# Patient Record
Sex: FEMALE | Race: WHITE | NOT HISPANIC OR LATINO
[De-identification: names, ages, dates, MRNs, and addresses within clinical notes are randomized per-mention and may not be internally consistent; named-entity substitution may affect disease eponyms.]

---

## 2017-05-02 PROBLEM — Z00.00 ENCOUNTER FOR PREVENTIVE HEALTH EXAMINATION: Status: ACTIVE | Noted: 2017-05-02

## 2017-05-10 ENCOUNTER — APPOINTMENT (OUTPATIENT)
Dept: OTOLARYNGOLOGY | Facility: CLINIC | Age: 62
End: 2017-05-10

## 2017-05-10 VITALS — WEIGHT: 170 LBS | BODY MASS INDEX: 26.68 KG/M2 | HEIGHT: 67 IN

## 2017-05-10 VITALS
SYSTOLIC BLOOD PRESSURE: 167 MMHG | OXYGEN SATURATION: 97 % | HEART RATE: 53 BPM | TEMPERATURE: 97.9 F | DIASTOLIC BLOOD PRESSURE: 91 MMHG

## 2017-05-10 DIAGNOSIS — Z82.2 FAMILY HISTORY OF DEAFNESS AND HEARING LOSS: ICD-10-CM

## 2017-05-10 DIAGNOSIS — Z78.9 OTHER SPECIFIED HEALTH STATUS: ICD-10-CM

## 2017-05-10 DIAGNOSIS — R42 DIZZINESS AND GIDDINESS: ICD-10-CM

## 2017-05-10 DIAGNOSIS — F15.90 OTHER STIMULANT USE, UNSPECIFIED, UNCOMPLICATED: ICD-10-CM

## 2017-05-10 DIAGNOSIS — Z82.49 FAMILY HISTORY OF ISCHEMIC HEART DISEASE AND OTHER DISEASES OF THE CIRCULATORY SYSTEM: ICD-10-CM

## 2017-05-10 DIAGNOSIS — H93.11 TINNITUS, RIGHT EAR: ICD-10-CM

## 2017-05-10 DIAGNOSIS — H91.90 UNSPECIFIED HEARING LOSS, UNSPECIFIED EAR: ICD-10-CM

## 2017-05-10 RX ORDER — PREDNISONE 10 MG/1
10 TABLET ORAL
Qty: 57 | Refills: 0 | Status: ACTIVE | COMMUNITY
Start: 2017-05-10 | End: 1900-01-01

## 2017-05-10 RX ORDER — OMEPRAZOLE 20 MG/1
20 CAPSULE, DELAYED RELEASE ORAL DAILY
Qty: 14 | Refills: 0 | Status: ACTIVE | COMMUNITY
Start: 2017-05-10 | End: 1900-01-01

## 2017-05-10 RX ORDER — OFLOXACIN OTIC 3 MG/ML
0.3 SOLUTION AURICULAR (OTIC) TWICE DAILY
Qty: 1 | Refills: 1 | Status: ACTIVE | COMMUNITY
Start: 2017-05-10 | End: 1900-01-01

## 2017-05-15 ENCOUNTER — APPOINTMENT (OUTPATIENT)
Dept: OTOLARYNGOLOGY | Facility: CLINIC | Age: 62
End: 2017-05-15

## 2017-05-15 VITALS
DIASTOLIC BLOOD PRESSURE: 82 MMHG | TEMPERATURE: 98.2 F | OXYGEN SATURATION: 95 % | HEART RATE: 51 BPM | SYSTOLIC BLOOD PRESSURE: 149 MMHG

## 2017-05-15 DIAGNOSIS — H91.21 SUDDEN IDIOPATHIC HEARING LOSS, RIGHT EAR: ICD-10-CM

## 2017-05-16 PROBLEM — H91.21 SUDDEN RIGHT HEARING LOSS: Status: ACTIVE | Noted: 2017-05-10

## 2017-05-18 ENCOUNTER — APPOINTMENT (OUTPATIENT)
Dept: OTOLARYNGOLOGY | Facility: CLINIC | Age: 62
End: 2017-05-18

## 2017-05-22 ENCOUNTER — APPOINTMENT (OUTPATIENT)
Dept: OTOLARYNGOLOGY | Facility: CLINIC | Age: 62
End: 2017-05-22

## 2017-05-22 VITALS
OXYGEN SATURATION: 97 % | DIASTOLIC BLOOD PRESSURE: 89 MMHG | TEMPERATURE: 98.9 F | HEART RATE: 52 BPM | SYSTOLIC BLOOD PRESSURE: 154 MMHG

## 2017-05-22 RX ORDER — OMEPRAZOLE 20 MG/1
20 CAPSULE, DELAYED RELEASE ORAL DAILY
Qty: 7 | Refills: 0 | Status: ACTIVE | COMMUNITY
Start: 2017-05-22 | End: 1900-01-01

## 2017-05-31 ENCOUNTER — APPOINTMENT (OUTPATIENT)
Dept: OTOLARYNGOLOGY | Facility: CLINIC | Age: 62
End: 2017-05-31

## 2017-05-31 VITALS
SYSTOLIC BLOOD PRESSURE: 139 MMHG | TEMPERATURE: 97.7 F | HEART RATE: 51 BPM | DIASTOLIC BLOOD PRESSURE: 81 MMHG | OXYGEN SATURATION: 96 %

## 2017-05-31 DIAGNOSIS — H92.02 OTALGIA, LEFT EAR: ICD-10-CM

## 2017-07-26 ENCOUNTER — APPOINTMENT (OUTPATIENT)
Dept: OTOLARYNGOLOGY | Facility: CLINIC | Age: 62
End: 2017-07-26

## 2017-07-26 VITALS — SYSTOLIC BLOOD PRESSURE: 144 MMHG | DIASTOLIC BLOOD PRESSURE: 85 MMHG

## 2017-07-26 DIAGNOSIS — H61.23 IMPACTED CERUMEN, BILATERAL: ICD-10-CM

## 2017-07-26 DIAGNOSIS — D33.3 BENIGN NEOPLASM OF CRANIAL NERVES: ICD-10-CM

## 2018-02-01 ENCOUNTER — APPOINTMENT (OUTPATIENT)
Dept: OTOLARYNGOLOGY | Facility: CLINIC | Age: 63
End: 2018-02-01
Payer: COMMERCIAL

## 2018-02-01 PROCEDURE — 99213 OFFICE O/P EST LOW 20 MIN: CPT

## 2018-02-01 PROCEDURE — 92550 TYMPANOMETRY & REFLEX THRESH: CPT

## 2018-02-01 PROCEDURE — 92557 COMPREHENSIVE HEARING TEST: CPT

## 2018-02-02 ENCOUNTER — APPOINTMENT (OUTPATIENT)
Dept: PHARMACY | Facility: CLINIC | Age: 63
End: 2018-02-02
Payer: SELF-PAY

## 2018-02-02 PROCEDURE — V5010 ASSESSMENT FOR HEARING AID: CPT

## 2018-02-16 ENCOUNTER — APPOINTMENT (OUTPATIENT)
Dept: PHARMACY | Facility: CLINIC | Age: 63
End: 2018-02-16
Payer: SELF-PAY

## 2018-02-16 PROCEDURE — V5299A: CUSTOM | Mod: NC

## 2018-07-31 ENCOUNTER — EMERGENCY (EMERGENCY)
Facility: HOSPITAL | Age: 63
LOS: 1 days | Discharge: HOME | End: 2018-07-31
Attending: EMERGENCY MEDICINE | Admitting: EMERGENCY MEDICINE
Payer: COMMERCIAL

## 2018-07-31 VITALS
OXYGEN SATURATION: 100 % | SYSTOLIC BLOOD PRESSURE: 180 MMHG | DIASTOLIC BLOOD PRESSURE: 90 MMHG | HEART RATE: 60 BPM | TEMPERATURE: 98 F | RESPIRATION RATE: 17 BRPM

## 2018-07-31 PROCEDURE — 28510 TREATMENT OF TOE FRACTURE: CPT | Mod: 54

## 2018-07-31 PROCEDURE — 99284 EMERGENCY DEPT VISIT MOD MDM: CPT | Mod: 25

## 2018-07-31 PROCEDURE — 73630 X-RAY EXAM OF FOOT: CPT | Mod: 26,LT

## 2018-07-31 RX ORDER — IBUPROFEN 200 MG
600 TABLET ORAL ONCE
Qty: 0 | Refills: 0 | Status: COMPLETED | OUTPATIENT
Start: 2018-07-31 | End: 2018-07-31

## 2018-07-31 RX ADMIN — Medication 600 MILLIGRAM(S): at 19:34

## 2018-07-31 RX ADMIN — Medication 600 MILLIGRAM(S): at 19:04

## 2018-07-31 NOTE — ED ADULT NURSE NOTE - NSIMPLEMENTINTERV_GEN_ALL_ED
Implemented All Universal Safety Interventions:  [place] to call system. Call bell, personal items and telephone within reach. Instruct patient to call for assistance. Room bathroom lighting operational. Non-slip footwear when patient is off stretcher. Physically safe environment: no spills, clutter or unnecessary equipment. Stretcher in lowest position, wheels locked, appropriate side rails in place.

## 2018-07-31 NOTE — ED PROVIDER NOTE - OBJECTIVE STATEMENT
63 YOF no pmh p/w left foot 5th phalanx injury after stubbing toe this morning. Pt noticed bruising over the base of the 5th toe since the morning. Pt denies any numbness or tingling. Pt has pain with movement. Pt ambulatory.

## 2018-07-31 NOTE — ED PROVIDER NOTE - ATTENDING CONTRIBUTION TO CARE
Patient presenting with injury to the L 5th dits after stubbing it. VSS. + ttp 5th to  p and ecchymosis. Xray shows non-displaced fx MC. Brenden taped and given ortho fu.
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
## 2018-08-04 DIAGNOSIS — Z91.013 ALLERGY TO SEAFOOD: ICD-10-CM

## 2018-08-04 DIAGNOSIS — Z88.0 ALLERGY STATUS TO PENICILLIN: ICD-10-CM
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
## 2018-08-04 DIAGNOSIS — S92.515A NONDISPLACED FRACTURE OF PROXIMAL PHALANX OF LEFT LESSER TOE(S), INITIAL ENCOUNTER FOR CLOSED FRACTURE: ICD-10-CM
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
## 2018-08-04 DIAGNOSIS — S99.922A UNSPECIFIED INJURY OF LEFT FOOT, INITIAL ENCOUNTER: ICD-10-CM

## 2019-01-31 ENCOUNTER — APPOINTMENT (OUTPATIENT)
Dept: OTOLARYNGOLOGY | Facility: CLINIC | Age: 64
End: 2019-01-31
Payer: COMMERCIAL

## 2019-01-31 VITALS
SYSTOLIC BLOOD PRESSURE: 163 MMHG | HEART RATE: 54 BPM | OXYGEN SATURATION: 98 % | DIASTOLIC BLOOD PRESSURE: 82 MMHG | RESPIRATION RATE: 17 BRPM | TEMPERATURE: 98.2 F

## 2019-01-31 DIAGNOSIS — H90.41 SENSORINEURAL HEARING LOSS, UNILATERAL, RIGHT EAR, WITH UNRESTRICTED HEARING ON THE CONTRALATERAL SIDE: ICD-10-CM

## 2019-01-31 PROCEDURE — 92557 COMPREHENSIVE HEARING TEST: CPT

## 2019-01-31 PROCEDURE — 92550 TYMPANOMETRY & REFLEX THRESH: CPT

## 2019-01-31 PROCEDURE — 99213 OFFICE O/P EST LOW 20 MIN: CPT

## 2019-01-31 NOTE — HISTORY OF PRESENT ILLNESS
[de-identified] : followup r asymm hf snhl and r acousitc neuroma- s/p treatment with gamma knife by Dr Mackenzie- scheduled for followup mri with him in March. She reports her hearing seems the same. She has been using loaner hearing aids with some succcess. She feels her hl uis moderate. No family hisotry relates to reason for visit.

## 2019-04-08 ENCOUNTER — APPOINTMENT (OUTPATIENT)
Dept: PHARMACY | Facility: CLINIC | Age: 64
End: 2019-04-08
Payer: SELF-PAY

## 2019-04-08 PROCEDURE — V5299A: CUSTOM | Mod: NC

## 2019-06-18 ENCOUNTER — APPOINTMENT (OUTPATIENT)
Dept: PHARMACY | Facility: CLINIC | Age: 64
End: 2019-06-18
Payer: SELF-PAY

## 2019-06-18 PROCEDURE — V5257F: CUSTOM

## 2019-07-10 ENCOUNTER — APPOINTMENT (OUTPATIENT)
Dept: PHARMACY | Facility: CLINIC | Age: 64
End: 2019-07-10
Payer: SELF-PAY

## 2019-07-10 PROCEDURE — V5299A: CUSTOM | Mod: NC

## 2019-08-19 ENCOUNTER — APPOINTMENT (OUTPATIENT)
Dept: OTOLARYNGOLOGY | Facility: CLINIC | Age: 64
End: 2019-08-19
Payer: COMMERCIAL

## 2019-08-19 VITALS
DIASTOLIC BLOOD PRESSURE: 80 MMHG | SYSTOLIC BLOOD PRESSURE: 158 MMHG | TEMPERATURE: 98.2 F | HEART RATE: 54 BPM | OXYGEN SATURATION: 98 %

## 2019-08-19 DIAGNOSIS — D33.3 BENIGN NEOPLASM OF CRANIAL NERVES: ICD-10-CM

## 2019-08-19 PROCEDURE — 99214 OFFICE O/P EST MOD 30 MIN: CPT

## 2019-08-19 RX ORDER — RANITIDINE 150 MG/1
150 TABLET ORAL DAILY
Qty: 30 | Refills: 5 | Status: ACTIVE | COMMUNITY
Start: 2019-08-19 | End: 1900-01-01

## 2019-08-19 RX ORDER — RANITIDINE HYDROCHLORIDE 150 MG/1
150 CAPSULE ORAL
Qty: 30 | Refills: 6 | Status: ACTIVE | COMMUNITY
Start: 2019-08-19 | End: 1900-01-01

## 2019-08-19 NOTE — DATA REVIEWED
[de-identified] : mri reviewed images with pt spouse and Dr Braden who I called to have it sent to me. an more enhancing but slightly smaller

## 2019-08-19 NOTE — HISTORY OF PRESENT ILLNESS
[de-identified] : 63 yo woman here with spouse c/o salty taste in mouth and also to followup on r acoustic neuroma. She has had gamma knife radiotherapy and had followup with Dr Mackenzie and said he did not seem concerned. had followup mri in 4/2019. The salty taste has been present for about a month. She saw Dr Thornton who she said examined her and saw no mucosal abnormalities and recommended she see an ent. She said hydration to 6 glasses of water/day improves it but it does not resolve. No pain no dysphagia -f.s.c She feels her hearing has not changed.

## 2019-08-19 NOTE — REASON FOR VISIT
[Initial Evaluation] : an initial evaluation for [FreeTextEntry2] : r acoustic neuroma, salty taste in mouth

## 2019-09-30 ENCOUNTER — APPOINTMENT (OUTPATIENT)
Dept: OTOLARYNGOLOGY | Facility: CLINIC | Age: 64
End: 2019-09-30
Payer: COMMERCIAL

## 2019-09-30 VITALS
SYSTOLIC BLOOD PRESSURE: 143 MMHG | RESPIRATION RATE: 16 BRPM | HEART RATE: 54 BPM | OXYGEN SATURATION: 97 % | DIASTOLIC BLOOD PRESSURE: 83 MMHG | TEMPERATURE: 97.8 F

## 2019-09-30 PROCEDURE — 99213 OFFICE O/P EST LOW 20 MIN: CPT

## 2019-09-30 NOTE — HISTORY OF PRESENT ILLNESS
[de-identified] : followup 65 yo woman with h/o acoustic neuroma and dysgeusia - she was found to have reflux laryngitis and started on dietary and mechanical precautions and ranitidine 6 weeks ago and is now noticing fullness i chest, need to sit up with eating and reflux sx. Dysgeusia is improved. She wishes to stop ranitidine because of the recent negative press. Has no dysphagia.

## 2019-09-30 NOTE — REASON FOR VISIT
[Subsequent Evaluation] : a subsequent evaluation for [FreeTextEntry2] : followup dysgeusia and reflux laryngitisi

## 2020-01-07 ENCOUNTER — APPOINTMENT (OUTPATIENT)
Dept: OTOLARYNGOLOGY | Facility: CLINIC | Age: 65
End: 2020-01-07
Payer: COMMERCIAL

## 2020-01-07 DIAGNOSIS — K21.9 ACUTE LARYNGITIS: ICD-10-CM

## 2020-01-07 DIAGNOSIS — J04.0 ACUTE LARYNGITIS: ICD-10-CM

## 2020-01-07 DIAGNOSIS — R43.2 PARAGEUSIA: ICD-10-CM

## 2020-01-07 PROCEDURE — 99214 OFFICE O/P EST MOD 30 MIN: CPT | Mod: 25

## 2020-01-07 PROCEDURE — 31575 DIAGNOSTIC LARYNGOSCOPY: CPT

## 2020-01-08 ENCOUNTER — APPOINTMENT (OUTPATIENT)
Dept: OTOLARYNGOLOGY | Facility: CLINIC | Age: 65
End: 2020-01-08
Payer: COMMERCIAL

## 2020-01-08 ENCOUNTER — TRANSCRIPTION ENCOUNTER (OUTPATIENT)
Age: 65
End: 2020-01-08

## 2020-01-08 ENCOUNTER — APPOINTMENT (OUTPATIENT)
Dept: PHARMACY | Facility: CLINIC | Age: 65
End: 2020-01-08
Payer: COMMERCIAL

## 2020-01-08 PROCEDURE — 92557 COMPREHENSIVE HEARING TEST: CPT

## 2020-01-08 PROCEDURE — 92550 TYMPANOMETRY & REFLEX THRESH: CPT

## 2020-01-08 PROCEDURE — V5299A: CUSTOM | Mod: NC

## 2020-01-20 PROBLEM — R43.2 DYSGEUSIA: Status: ACTIVE | Noted: 2019-08-19

## 2020-01-20 NOTE — ASSESSMENT
[FreeTextEntry1] : 64F here for initial evaluation. She reports a variety of otolaryngologic complaints over the past few months.\par She c/o nasal congestion, postnasal drip, nasal drainage with a 'wet nose' which is worst when she is moving/walking, with occasional mucus. Boroleum helps the nasal congestion and sour candies help her throat.\par She has a known right sided vestibular schwannoma, underwent gamma knife in 2017. She has regular surveillance MRIs and followup visits to monitor for tumor growth. Ever since completing GK, she has had a salty taste in the back of her throat with subjective right facial swelling, and has been told these are consequences of the GK. Additionally, she is followed by GI physicians for reflux and has been on occasional H2B (in AM) with a healthy diet/lifestyle. There is no h/o sinusitis, no hayfever sx or allergies, no asthma.\par On exam, nasal endoscopy shows mild turbinate hypertrophy with right middle meatal edema and relative obstruction and mild nasopharyngeal erythema w overlying crusting. Fiberoptic laryngoscopy just shows minimal hypopharyngeal edema. The rest of the head and neck exam is unremarkable.\par Unclear etiology to constellation of upper airway complaints. It is certainly possible that the GK is playing a role in sx, in addition to uncontrolled laryngopharyngeal reflux. Further, there is rhinitis and nasopharyngitis on exam today and also possible right sided sinonasal disease (right middle meatal obstruction).\par As such, instructed to take the H2B daily and not occasionally. Add nasal steroid daily with boroleum. Will get CT sinus to assess paranasal sinuses. RTO 6 weeks.

## 2020-01-20 NOTE — HISTORY OF PRESENT ILLNESS
[de-identified] : 64F here for initial evaluation.\par \par She reports a variety of otolaryngologic complaints over the past few months.\par She c/o nasal congestion, postnasal drip, nasal drainage with a 'wet nose' which is worst when she is moving/walking, with occasional mucus. Boroleum helps the nasal congestion and sour candies help her throat.\par \par She has a known right sided vestibular schwannoma, underwent gamma knife in 2017. She has regular surveillance MRIs and followup visits to monitor for tumor growth.\par Ever since completing GK, she has had a salty taste in the back of her throat wioth subjective right facial swelling, and has been told these are consequences of the GK.\par Additionally, she is followed by GI physicians for reflux and has been on occasional H2B (in AM) with a healthy diet/lifestyle.\par There is no h/o sinusitis, no hayfever sx or allergies, no asthma.\par \par ROS otherwise unremarkable.

## 2020-01-20 NOTE — DATA REVIEWED
17-Jun-2018 14:32
[No studies available for review at this time] : No studies available for review at this time

## 2020-01-20 NOTE — PROCEDURE
[FreeTextEntry3] : Nasal Endoscopy:\par mild turbinate hypertrophy\par mild right middle meatal edema and relative obstruction\par mild nasopharyngeal erythema w overlying crusting\par no mucopus or polyps\par \par Fiberoptic Laryngoscopy:\par upper airway widely patent\par TV symmetric and mobile\par minimal postcricoid edema

## 2020-01-20 NOTE — CONSULT LETTER
[Dear  ___] : Dear  [unfilled], [Consult Closing:] : Thank you very much for allowing me to participate in the care of this patient.  If you have any questions, please do not hesitate to contact me. [Courtesy Letter:] : I had the pleasure of seeing your patient, [unfilled], in my office today. [Sincerely,] : Sincerely, [FreeTextEntry3] : Julio C Manzanares MD\par Department of Otolaryngology - Head and Neck Surgery\par [facility]

## 2020-01-30 ENCOUNTER — APPOINTMENT (OUTPATIENT)
Dept: OTOLARYNGOLOGY | Facility: CLINIC | Age: 65
End: 2020-01-30

## 2020-02-14 NOTE — ED ADULT NURSE NOTE - NS ED NOTE  TALK SOMEONE YN
[FreeTextEntry1] : CERUMEN IMPACTION\par HYDROGEN PEROXIDE BEFORE THE BATH\par \par TEETHING SYNDROME\par 
No

## 2020-02-25 ENCOUNTER — APPOINTMENT (OUTPATIENT)
Dept: OTOLARYNGOLOGY | Facility: CLINIC | Age: 65
End: 2020-02-25
Payer: COMMERCIAL

## 2020-02-25 VITALS
HEIGHT: 67 IN | BODY MASS INDEX: 27.94 KG/M2 | WEIGHT: 178 LBS | DIASTOLIC BLOOD PRESSURE: 83 MMHG | SYSTOLIC BLOOD PRESSURE: 145 MMHG | HEART RATE: 55 BPM

## 2020-02-25 DIAGNOSIS — J31.0 CHRONIC RHINITIS: ICD-10-CM

## 2020-02-25 PROCEDURE — 99213 OFFICE O/P EST LOW 20 MIN: CPT

## 2020-02-25 NOTE — HISTORY OF PRESENT ILLNESS
[de-identified] : 64F here in followup.\par \par Since last seen 6 weeks ago, she is actually feeling better. She has been using the nasal steroid spray mostly daily and feels it is helping her congestion and postnasal drip. She has not been using the famotidine, but does maintain healthy diet/lifestyle. She still has runny nose when outside walking, though, but this is improved as well.\par CT Sinus 2/21/20 (I reviewed) shows no evidence of paranasal sinus disease and unremarkable.\par \par From prior note-\par She reports a variety of otolaryngologic complaints over the past few months.\par She c/o nasal congestion, postnasal drip, nasal drainage with a 'wet nose' which is worst when she is moving/walking, with occasional mucus. Boroleum helps the nasal congestion and sour candies help her throat.\par \par She has a known right sided vestibular schwannoma, underwent gamma knife in 2017. She has regular surveillance MRIs and followup visits to monitor for tumor growth.\par Ever since completing GK, she has had a salty taste in the back of her throat wioth subjective right facial swelling, and has been told these are consequences of the GK.\par Additionally, she is followed by GI physicians for reflux and has been on occasional H2B (in AM) with a healthy diet/lifestyle.\par There is no h/o sinusitis, no hayfever sx or allergies, no asthma.\par \par ROS otherwise unremarkable.

## 2020-02-25 NOTE — ASSESSMENT
[FreeTextEntry1] : 64F here in followup. Since last seen 6 weeks ago, she is actually feeling better. She has been using the nasal steroid spray mostly daily and feels it is helping her congestion and postnasal drip. She has not been using the famotidine, but does maintain healthy diet/lifestyle. She still has runny nose when outside walking, though, but this is improved as well.\par \par Initially seen 6 weeks ago with a variety of otolaryngologic complaints over the past few months.\par She c/o nasal congestion, postnasal drip, nasal drainage with a 'wet nose' which is worst when she is moving/walking, with occasional mucus. Boroleum helps the nasal congestion and sour candies help her throat.\par She has a known right sided vestibular schwannoma, underwent gamma knife in 2017. She has regular surveillance MRIs and followup visits to monitor for tumor growth. Ever since completing GK, she has had a salty taste in the back of her throat with subjective right facial swelling, and has been told these are consequences of the GK. Additionally, she is followed by GI physicians for reflux and has been on occasional H2B (in AM) with a healthy diet/lifestyle. There is no h/o sinusitis, no hayfever sx or allergies, no asthma.\par \par Recent CT Sinus 2/21/20 (I reviewed) shows no evidence of paranasal sinus disease and is otherwise unremarkable.\par \par On exam, rhinoscopy shows mild turbinate hypertrophy and mild mucus. The rest of the head and neck exam is unremarkable.\par Unclear etiology to constellation of upper airway complaints. However, given fact she is much better on the nasal steroid sprays, primary etiology is likely a rhinitis with postnasal drip and much less likely component for laryngopharyngeal reflux, since she has been off H2Bs.\par There is no e/o paranasal sinus disease on CT.\par She may also have a component of nonallergic vasomotor rhinitis given drainage while walking outside is colder temperatures as well.\par \par As such, for now, continue nasal steroid sprays; I will add ipratropium nasal spray as a trial to see if rhinorrhea improved as well. All questions answered' she will let me know how she is doing over the next 4-6 weeks.

## 2020-02-25 NOTE — CONSULT LETTER
[Dear  ___] : Dear  [unfilled], [Consult Closing:] : Thank you very much for allowing me to participate in the care of this patient.  If you have any questions, please do not hesitate to contact me. [Courtesy Letter:] : I had the pleasure of seeing your patient, [unfilled], in my office today. [Sincerely,] : Sincerely, [FreeTextEntry3] : Julio C Manzanares MD\par Department of Otolaryngology - Head and Neck Surgery\par [facility]

## 2020-02-25 NOTE — PROCEDURE
[FreeTextEntry3] : Rhinoscopy:\par mild turbinate hypertrophy, mild sinonasal mucosal erythema and mucus\par \par Nasal Endoscopy (from prior visit):\par mild turbinate hypertrophy\par mild right middle meatal edema and relative obstruction\par mild nasopharyngeal erythema w overlying crusting\par no mucopus or polyps\par \par Fiberoptic Laryngoscopy (from prior visit):\par upper airway widely patent\par TV symmetric and mobile\par minimal postcricoid edema

## 2020-02-25 NOTE — DATA REVIEWED
[de-identified] : CT Sinus 2/21/20:\par FINDINGS: \par \par Again there is severe deviation of the nasal septum to the left with a large right kendell bullosa. There is turbinate hypertrophy. The findings result in narrowing of the nasal passages.\par \par The ostiomeatal unit complexes are congenitally narrowed but patent. This can predispose to sinus disease.\par \par There is some mucosal thickening involving the septae of the ethmoid air cells. The frontoethmoidal recesses and the frontal sinus are clear.\par \par The sphenoid sinus and the sphenoethmoidal recesses are clear.\par \par The posterior nasopharynx is normal. The parapharyngeal fat is nondisplaced. The  space is normal.\par \par The lamina papyracea is intact. The cribriform plate is normal. The dileep anabelle is normal.\par \par There are no osteoblastic or osteolytic lesions identified. Right vestibular schwannoma difficult to see on CAT scan.\par \par IMPRESSION:  \par \par 1. Severe deviation of the nasal septum to the left with a large right kendell bullosa and turbinate hypertrophy narrowing the nasal passages.\par \par 2. 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 Mild mucosal thickening septae of the ethmoid air cells.\par \par 4. The remainder the paranasal sinuses are clear. The recesses are clear.

## 2020-05-01 ENCOUNTER — RX RENEWAL (OUTPATIENT)
Age: 65
End: 2020-05-01

## 2020-06-17 ENCOUNTER — APPOINTMENT (OUTPATIENT)
Dept: PHARMACY | Facility: CLINIC | Age: 65
End: 2020-06-17

## 2020-10-13 ENCOUNTER — RX RENEWAL (OUTPATIENT)
Age: 65
End: 2020-10-13

## 2022-04-11 PROBLEM — J04.0 REFLUX LARYNGITIS: Status: ACTIVE | Noted: 2019-09-30

## 2022-05-16 RX ORDER — IPRATROPIUM BROMIDE 21 UG/1
0.03 SPRAY NASAL
Qty: 1 | Refills: 2 | Status: ACTIVE | COMMUNITY
Start: 2020-02-25 | End: 1900-01-01

## 2022-05-16 RX ORDER — FLUTICASONE PROPIONATE 50 UG/1
50 SPRAY, METERED NASAL
Qty: 16 | Refills: 2 | Status: ACTIVE | COMMUNITY
Start: 2020-01-08 | End: 1900-01-01

## 2022-12-13 ENCOUNTER — EMERGENCY (EMERGENCY)
Facility: HOSPITAL | Age: 67
LOS: 1 days | Discharge: HOME | End: 2022-12-13
Attending: EMERGENCY MEDICINE | Admitting: EMERGENCY MEDICINE
Payer: COMMERCIAL

## 2022-12-13 ENCOUNTER — EMERGENCY (EMERGENCY)
Facility: HOSPITAL | Age: 67
LOS: 1 days | Discharge: HOME | End: 2022-12-13
Attending: EMERGENCY MEDICINE | Admitting: EMERGENCY MEDICINE

## 2022-12-13 VITALS
HEART RATE: 40 BPM | OXYGEN SATURATION: 100 % | RESPIRATION RATE: 18 BRPM | WEIGHT: 169.76 LBS | HEIGHT: 67 IN | DIASTOLIC BLOOD PRESSURE: 77 MMHG | SYSTOLIC BLOOD PRESSURE: 171 MMHG

## 2022-12-13 VITALS
TEMPERATURE: 98 F | RESPIRATION RATE: 16 BRPM | OXYGEN SATURATION: 100 % | SYSTOLIC BLOOD PRESSURE: 181 MMHG | DIASTOLIC BLOOD PRESSURE: 81 MMHG | HEART RATE: 63 BPM

## 2022-12-13 VITALS
RESPIRATION RATE: 16 BRPM | HEART RATE: 58 BPM | SYSTOLIC BLOOD PRESSURE: 166 MMHG | HEIGHT: 67 IN | DIASTOLIC BLOOD PRESSURE: 85 MMHG | TEMPERATURE: 98 F | OXYGEN SATURATION: 98 % | WEIGHT: 169.98 LBS

## 2022-12-13 DIAGNOSIS — S52.502A UNSPECIFIED FRACTURE OF THE LOWER END OF LEFT RADIUS, INITIAL ENCOUNTER FOR CLOSED FRACTURE: ICD-10-CM
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
## 2022-12-13 DIAGNOSIS — Z91.013 ALLERGY TO SEAFOOD: ICD-10-CM

## 2022-12-13 DIAGNOSIS — I10 ESSENTIAL (PRIMARY) HYPERTENSION: ICD-10-CM

## 2022-12-13 DIAGNOSIS — R42 DIZZINESS AND GIDDINESS: ICD-10-CM

## 2022-12-13 DIAGNOSIS — Z88.0 ALLERGY STATUS TO PENICILLIN: ICD-10-CM

## 2022-12-13 DIAGNOSIS — R00.1 BRADYCARDIA, UNSPECIFIED: ICD-10-CM
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
## 2022-12-13 PROCEDURE — 99284 EMERGENCY DEPT VISIT MOD MDM: CPT

## 2022-12-13 PROCEDURE — 73110 X-RAY EXAM OF WRIST: CPT | Mod: 26,LT,76

## 2022-12-13 PROCEDURE — 99285 EMERGENCY DEPT VISIT HI MDM: CPT | Mod: 57

## 2022-12-13 PROCEDURE — 25605 CLTX DST RDL FX/EPHYS SEP W/: CPT | Mod: 54

## 2022-12-13 RX ORDER — KETOROLAC TROMETHAMINE 30 MG/ML
15 SYRINGE (ML) INJECTION ONCE
Refills: 0 | Status: DISCONTINUED | OUTPATIENT
Start: 2022-12-13 | End: 2022-12-13

## 2022-12-13 RX ORDER — MORPHINE SULFATE 50 MG/1
4 CAPSULE, EXTENDED RELEASE ORAL ONCE
Refills: 0 | Status: DISCONTINUED | OUTPATIENT
Start: 2022-12-13 | End: 2022-12-13

## 2022-12-13 RX ORDER — LIDOCAINE HCL 20 MG/ML
10 VIAL (ML) INJECTION ONCE
Refills: 0 | Status: COMPLETED | OUTPATIENT
Start: 2022-12-13 | End: 2022-12-13

## 2022-12-13 RX ADMIN — Medication 10 MILLILITER(S): at 22:47

## 2022-12-13 RX ADMIN — Medication 15 MILLIGRAM(S): at 12:02

## 2022-12-13 RX ADMIN — MORPHINE SULFATE 4 MILLIGRAM(S): 50 CAPSULE, EXTENDED RELEASE ORAL at 13:28

## 2022-12-13 NOTE — ED ADULT NURSE REASSESSMENT NOTE - NS ED NURSE REASSESS COMMENT FT1
Pt received from previous shift. Pt resting on stretcher, no apparent distress, pending post splinting wrist imaging, denies pain. HR 58 on CM.

## 2022-12-13 NOTE — ED PROVIDER NOTE - OBJECTIVE STATEMENT
Patient reports she walked backwards to look at something and backed into a grate around a tree. fell, landed on left wrist. Afterwards, felt lightheaded. Denies head injury, cp, sob, ap, n/v/d. Found to be bradycardic. Patient reports when she had a colonoscopy a few weeks ago, she was told her HR was "a little low."

## 2022-12-13 NOTE — ED ADULT NURSE NOTE - NSIMPLEMENTINTERV_GEN_ALL_ED
Implemented All Fall Risk Interventions:  [place] to call system. Call bell, personal items and telephone within reach. Instruct patient to call for assistance. Room bathroom lighting operational. Non-slip footwear when patient is off stretcher. Physically safe environment: no spills, clutter or unnecessary equipment. Stretcher in lowest position, wheels locked, appropriate side rails in place. Provide visual cue, wrist band, yellow gown, etc. Monitor gait and stability. Monitor for mental status changes and reorient to person, place, and time. Review medications for side effects contributing to fall risk. Reinforce activity limits and safety measures with patient and family.

## 2022-12-13 NOTE — ED PROVIDER NOTE - CLINICAL SUMMARY MEDICAL DECISION MAKING FREE TEXT BOX
Patient with wrist fracture with incidental finding of bradycardia. HR normalized in ED without intervention. Will check labs, reassess.

## 2022-12-13 NOTE — ED PROVIDER NOTE - CARE PROVIDER_API CALL
Maria Victoria Anderson)  Orthopaedic Surgery Surgery  [address] [place] [address]  Phone: [phone]  Fax: [phone]  Follow Up Time: 7-10 Days

## 2022-12-13 NOTE — ED PROVIDER NOTE - PROGRESS NOTE DETAILS
scotty / josh -  pt w distal radius fx today, reduced by lhgv. here w L hand discoloration in splint.   plan for ortho to reduce / resplint.   at time of sign out pending ortho reduction, post reduction xr and final dispo shoemaker - reduced by ortho. post reduction imaging ok'd by ortho. ok for dc and outpt ortho f/u.     All results reviewed with the patient verbally. Discharge plan and return precautions d/w pt who verbalized understanding and agrees with plan. All questions answered. Vitals WNL. Ready for d/c.

## 2022-12-13 NOTE — ED PROVIDER NOTE - CARE PROVIDER_API CALL
Maria Victoria Anderson)  Orthopaedic Surgery Surgery  [address] [place] [address]  Phone: [phone]  Fax: [phone]  Follow Up Time:

## 2022-12-13 NOTE — ED ADULT NURSE NOTE - OBJECTIVE STATEMENT
pt received into proc room A&Ox4 ambulatory appears comfortable arrives via walk in triage for eval of splint placed to left wrist/ for arm at [facility] earlier today. Pt reports s/p mechanical FOOSH landed on left arm with dx left distal radial fx a splint was applied and pt DC'ed. shortly after getting home pt reports blue/purple discoloration to fingers of left hand and appearing more swollen. still able to wiggle fingers and sensation intact but appears swollen and discolored. Otherwise no complaints neuro intact unlabored respirations airway patent no cp sob cough. medicated per orders to be re splinted by ortho

## 2022-12-13 NOTE — ED ADULT NURSE NOTE - OBJECTIVE STATEMENT
pt has simple trip and fall in street today , foosh , did not hit head has almost compound fracture to left wrist , nv intact, pt got cab to er , found to be bradycardic @ 35 bpm , took her valsartan and amlodipine this am, nil c/o dizziness, gcs 15 throughout, iv line placed, code cart placed outside room

## 2022-12-13 NOTE — ED PROVIDER NOTE - ATTENDING APP SHARED VISIT CONTRIBUTION OF CARE
67f sustained left distal radius fracture earlier today, reduced and splinted at [facility], shortly thereafter developed coldness, valerie and discoloration of affected hand. on PA exam hand cool with purple discoloration, splint loosened by pa. on my exam hand regular color, warm, good cap refill. ortho paged for evaluation, resplinting.

## 2022-12-13 NOTE — ED PROCEDURE NOTE - NS ED PERI NEURO NEG
Pre-application: Motor, sensory, and vascular responses intact in the injured extremity./Post-application: Motor, sensory, and vascular responses intact in the injured extremity.
No

## 2022-12-13 NOTE — ED PROVIDER NOTE - PHYSICAL EXAMINATION
Gen: Well-developed, well-nourished, NAD, VS as noted by nursing. HEENT: NCAT, mmm   Chest: RRR, nl S1 and S2, no m/r/g. Resp: CTAB, no w/r/r  Abd: nl BS, soft, nt/nd. Ext: Warm, dry. left wrist deformity and limited ROM. neurovascular and tendon intact distal to injury   Neuro: CN II-XII intact, normal and equal strength, sensation, and reflexes bilaterally, normal gait  Psych: AAOx3

## 2022-12-13 NOTE — ED PROVIDER NOTE - PATIENT PORTAL LINK FT
You can access the FollowMyHealth Patient Portal offered by [facility] by registering at the following website: [email or web address]. By joining [employer]’s FollowMyHealth portal, you will also be able to view your health information using other applications (apps) compatible with our system.

## 2022-12-13 NOTE — ED PROVIDER NOTE - NS ED ATTENDING STATEMENT MOD
This was a shared visit with the ASMITA. I reviewed and verified the documentation and independently performed the documented:

## 2022-12-13 NOTE — ED PROVIDER NOTE - OBJECTIVE STATEMENT
66 yo f RHD with pmh of htn and hypothyroidism s/p trip and fall earlier today with L distal radius fx, splinted at [facility] c/o L hand changing color and cold that started shortly after she left the ED. Denies numbness, tingling.

## 2022-12-13 NOTE — ED PROVIDER NOTE - PHYSICAL EXAMINATION
CONSTITUTIONAL: Well-appearing;  in no apparent distress.   HEAD: Normocephalic; atraumatic.   NECK: Supple; non-tender;   CARDIOVASCULAR: rrr,  RESPIRATORY: Breathing easily;   MSK: LUE splint in place, fingers swollen, cool, and slightly discolored, good cap refill   EXT: No cyanosis or edema; N/V intact  SKIN: Normal for age and race; warm; dry; good turgor; no apparent lesions or rash.

## 2022-12-13 NOTE — ED PROVIDER NOTE - PROGRESS NOTE DETAILS
HR normalized. PRINCE Horton (PGY-3) - fracture reduced, see procedure note, splinted. d/w care w/ patient. Will follow up with ortho in 7 days.

## 2022-12-13 NOTE — ED PROVIDER NOTE - NSFOLLOWUPINSTRUCTIONS_ED_ALL_ED_FT
You were seen in the Emergency Department for a distal radius fracture. Your fracture was reduced and splinted. Please follow up with orthopaedics in 1 week. You can take acetaminophen and/or ibuprofen for pain.      1) Continue all previously prescribed medications as directed.    2) Follow up with your primary care physician - take copies of your results.    3) Return to the Emergency Department for worsening or persistent symptoms, and/or ANY NEW OR CONCERNING SYMPTOMS.      Colles Fracture       A Colles fracture is a type of broken wrist. It means that the radius bone is broken or cracked near the wrist joint. The radius is one of two bones in the forearm. It is on the same side as the thumb. The other forearm bone is called the ulna. Often, when someone has a Colles fracture, the ulna is also broken.    As this injury heals, a splint or a cast is used to prevent the injured bone from moving (keep it immobilized).      What are the causes?    Common causes of this type of fracture include:  •A hard, direct hit to the wrist.      •Injuries, such as a car crash or falling on an outstretched hand.        What increases the risk?    The following factors may make you more likely to develop this condition:  •Playing contact sports or high-risk sports, such as skiing, biking, or ice-skating.      •Smoking.      •Drinking more than three alcoholic beverages a day.      •Having low or lowered bone density (osteoporosis or osteopenia).      •Being an older adult. Young children also have a higher risk.      •Being a woman who has gone through menopause.      •Having a history of bone fractures.      •Not getting enough (having a deficiency in) calcium or vitamin D.        What are the signs or symptoms?    Symptoms of this condition include:  •Tenderness, bruising, and swelling over the fracture, which is usually near the wrist.      •The wrist hanging in an odd position or looking misshapen (deformed).      •Difficulty moving the wrist.        How is this diagnosed?    This condition may be diagnosed based on:  •A physical exam.      •Your symptoms and medical history.      •An X-ray of the forearm and wrist.        How is this treated?    Treatment depends on many factors, including your age, your activity level, and how severe your fracture is. Treatment may include:  •Immobilizing the wrist with a splint or a cast for several weeks. Before a splint or cast is placed on the arm, the health care provider may move the fractured bone or bones back into place (realignment).      •Surgery. This may be done if the bone is completely out of place (displaced). Metal pins or other devices may be used to help hold the bone in place while it heals. After surgery, the arm is put in a splint or cast.      •Physical therapy.      •Pain medicine.        Follow these instructions at home:    If you have a splint:     •Wear the splint as told by your health care provider. Remove it only as told by your health care provider.      •Loosen the splint if your fingers tingle, become numb, or turn cold and blue.      •Keep the splint clean.    •If your splint is not waterproof:  •Do not let it get wet.      •Cover it with a watertight covering when you take a bath or a shower.        If you have a cast:     • Do not stick anything inside the cast to scratch your skin. Doing that increases your risk for infection.      •Check the skin around the cast every day. Tell your health care provider about any concerns.      •You may put lotion on dry skin around the edges of the cast. Do not put lotion on the skin underneath the cast.      •Keep the cast clean.    •If the cast is not waterproof:   •Do not let it get wet.       •Cover it with a watertight covering when you take a bath or a shower.          Managing pain, stiffness, and swelling    •If directed, put ice on the injured area. To do this:  •If you have a removable splint, remove it as told by your health care provider.      •Put ice in a plastic bag.      •Place a towel between your skin and the bag, or between your cast and the bag.      •Leave the ice on for 20 minutes, 2–3 times a day.      •Remove the ice if your skin turns bright red. This is very important. If you cannot feel pain, heat, or cold, you have a greater risk of damage to the area.        •Move your fingers often to reduce stiffness and swelling.      •Raise (elevate) your wrist above the level of your heart while you are sitting or lying down.      Activity     • Do not lift anything that is heavier than 10 lb (4.5 kg), or the limit that you are told, until your health care provider says that it is safe.      • Do not use your arm to support your body weight until your health care provider says that you can.      •Ask your health care provider when it is safe to drive if you have a splint or cast on your arm.      •Return to your normal activities as told by your health care provider. Ask your health care provider what activities are safe for you.      •If physical therapy was prescribed, do exercises as told by your health care provider.      Medicines     •Take over-the-counter and prescription medicines only as told by your health care provider.    •Ask your health care provider if the medicine prescribed to you:  •Requires you to avoid driving or using machinery.    •Can cause constipation. You may need to take these actions to prevent or treat constipation:  •Drink enough fluid to keep your urine pale yellow.      •Take over-the-counter or prescription medicines.      •Eat foods that are high in fiber, such as beans, whole grains, and fresh fruits and vegetables.      •Limit foods that are high in fat and processed sugars, such as fried or sweet foods.          General instructions     • Do not put pressure on any part of the splint or cast until it is fully hardened. This may take several hours.      • Do not use any products that contain nicotine or tobacco, such as cigarettes, e-cigarettes, and chewing tobacco. These can delay bone healing. If you need help quitting, ask your health care provider.      •Keep all follow-up visits. This is important.        Contact a health care provider if:  •Your splint or cast:  •Gets wet or damaged.      •Suddenly feels too tight.        •You have a fever or chills.      •You have pain that does not get better with medicine.      •You have swelling that gets worse.        Get help right away if:    •Your hand or fingers become numb or turn cold, blue, or gray.      •You have tingling or numbness in your fingers, even after you loosen your splint (if this applies).        Summary    •A Colles fracture is a type of broken wrist. It often involves both of the bones in the forearm (radius and ulna).      •Fractures are common in people who play contact sports or high-risk sports. They are also common in older people who are at risk for osteoporosis.      •This injury is diagnosed with a physical exam and X-rays.      •Your wrist will need to be held in place (immobilized) with a splint or cast for several weeks. You may need surgery for a more severe fracture.      This information is not intended to replace advice given to you by your health care provider. Make sure you discuss any questions you have with your health care provider.

## 2022-12-13 NOTE — ED PROVIDER NOTE - CLINICAL SUMMARY MEDICAL DECISION MAKING FREE TEXT BOX
68 yo f RHD with pmh of htn and hypothyroidism s/p trip and fall earlier today with L distal radius fx, splinted at [facility] c/o L hand changing color and cold that started shortly after she left the ED. Denies numbness, tingling. LUE splint in place, fingers swollen, cool, and slightly discolored, good cap refill. Ace wrap removed and splint loosened. Color and temp already improving. Ortho consulted

## 2022-12-13 NOTE — ED PROVIDER NOTE - PATIENT PORTAL LINK FT
You can access the FollowMyHealth Patient Portal offered by [facility] by registering at the following website: [email or web address]. By joining [employer]’s FollowMyHealth portal, you will also be able to view your health information using other applications (apps) compatible with our system.

## 2022-12-13 NOTE — ED ADULT TRIAGE NOTE - CHIEF COMPLAINT QUOTE
s/p splint placement on L wrist, now c/o hand swelling and turning blue. was told by ortho to come to the ED. + ROM, + sensation

## 2022-12-14 PROBLEM — I10 ESSENTIAL (PRIMARY) HYPERTENSION: Chronic | Status: ACTIVE | Noted: 2022-12-13

## 2022-12-14 PROCEDURE — 25605 CLTX DST RDL FX/EPHYS SEP W/: CPT | Mod: LT

## 2022-12-14 PROCEDURE — 73110 X-RAY EXAM OF WRIST: CPT | Mod: 26,LT

## 2022-12-14 PROCEDURE — 99285 EMERGENCY DEPT VISIT HI MDM: CPT | Mod: 25

## 2022-12-14 PROCEDURE — 73110 X-RAY EXAM OF WRIST: CPT

## 2022-12-14 NOTE — CONSULT NOTE ADULT - SUBJECTIVE AND OBJECTIVE BOX
Orthopaedic Surgery Consult Note    Attending Physician: Justin    CC: L wrist pain    HPI:  66 yo f RHD with pmh of htn and hypothyroidism s/p trip and fall earlier today with L distal radius fx, splinted at [facility] c/o L hand changing color and cold that started shortly after she left the ED. Denies numbness, tingling.    Allergies    penicillins (Unknown)  shellfish (Unknown)    Intolerances      PAST MEDICAL & SURGICAL HISTORY:  Hypertension          Meds:      Family History:  Denies family history of bleeding disorders    Social History:   Pt is a nonsmoker  Social EtOH use     Review of Systems:  All review of systems are negative except for those mentioned in HPI.    Physical Exam:  General: Pt Alert and oriented, NAD  L wrist TTP, swollen, ecchymosis, obvious deformity, skin intact  Pulses: 2+ radial pulses, wwp, cap refill <3 seconds  Sensation: SILT radial/median/ulnar distributions  Motor: AIN/PIN/ulnar motors firing    Vital Signs Last 24 Hrs  T(C): 36.7 (13 Dec 2022 20:12), Max: 36.7 (13 Dec 2022 20:12)  T(F): 98.1 (13 Dec 2022 20:12), Max: 98.1 (13 Dec 2022 20:12)  HR: 58 (13 Dec 2022 20:12) (34 - 72)  BP: 166/85 (13 Dec 2022 20:12) (166/85 - 199/99)  BP(mean): --  RR: 16 (13 Dec 2022 20:12) (16 - 18)  SpO2: 98% (13 Dec 2022 20:12) (98% - 100%)    Parameters below as of 13 Dec 2022 20:12  Patient On (Oxygen Delivery Method): room air          Labs:                        13.3   5.59  )-----------( 191      ( 13 Dec 2022 11:30 )             40.7     12-13    142  |  107  |  30<H>  ----------------------------<  154<H>  4.0   |  29  |  0.81    Ca    9.4      13 Dec 2022 11:30    TPro  7.4  /  Alb  3.5  /  TBili  0.2  /  DBili  x   /  AST  23  /  ALT  20  /  AlkPhos  137<H>  12-13        Imaging:   Xray  L wrist (AP/lateral/oblique): intra-articular fracture of distal radius with dorsal comminution and angulation    A/P: 67yFemale with L distal radius fracture s/p unsuccessful closed reduction attempt early today at [facility], now s/p closed reduction at [facility].  - stable  - pain control  - hematoma block with 1% lidocaine w/o epi  - closed reduced and placed in sugarton splint, NVI post reduction  - NWPAULA PEREZ in sling/splint  - f/u outpatient with Dr. Anderson  - Discussed with Attending, Dr. Justin Cole, PGY-2  Ortho Pager [phone]

## 2022-12-16 DIAGNOSIS — Z88.0 ALLERGY STATUS TO PENICILLIN: ICD-10-CM

## 2022-12-16 DIAGNOSIS — Z91.013 ALLERGY TO SEAFOOD: ICD-10-CM

## 2022-12-16 DIAGNOSIS — S52.572A OTHER INTRAARTICULAR FRACTURE OF LOWER END OF LEFT RADIUS, INITIAL ENCOUNTER FOR CLOSED FRACTURE: ICD-10-CM
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
## 2022-12-16 DIAGNOSIS — E03.9 HYPOTHYROIDISM, UNSPECIFIED: ICD-10-CM

## 2022-12-16 DIAGNOSIS — I10 ESSENTIAL (PRIMARY) HYPERTENSION: ICD-10-CM
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
## 2022-12-18 ENCOUNTER — NON-APPOINTMENT (OUTPATIENT)
Age: 67
End: 2022-12-18

## 2023-03-02 ENCOUNTER — NON-APPOINTMENT (OUTPATIENT)
Age: 68
End: 2023-03-02

## 2023-09-14 NOTE — ED PROCEDURE NOTE - CPROC ED INFORMED CONSENT1
Benefits, risks, and possible complications of procedure explained to patient/caregiver who verbalized understanding and gave verbal consent.
Patient expressed no known problems or needs

## 2024-01-01 ENCOUNTER — NON-APPOINTMENT (OUTPATIENT)
Age: 69
End: 2024-01-01

## 2024-03-19 ENCOUNTER — LAB (OUTPATIENT)
Dept: URBAN - METROPOLITAN AREA CLINIC 68 | Facility: CLINIC | Age: 69
End: 2024-03-19

## 2024-03-27 ENCOUNTER — DAC (OUTPATIENT)
Dept: URBAN - METROPOLITAN AREA SURGERY CENTER 12 | Facility: SURGERY CENTER | Age: 69
End: 2024-03-27
Payer: MEDICARE

## 2024-03-27 DIAGNOSIS — Z86.010 PERSONAL HISTORY OF COLONIC POLYPS: ICD-10-CM

## 2024-03-27 DIAGNOSIS — K63.5 POLYP OF ASCENDING COLON, UNSPECIFIED TYPE: ICD-10-CM

## 2024-03-27 DIAGNOSIS — K64.8 OTHER HEMORRHOIDS: ICD-10-CM

## 2024-03-27 PROCEDURE — 45380 COLONOSCOPY AND BIOPSY: CPT | Performed by: INTERNAL MEDICINE

## 2024-12-31 ENCOUNTER — NON-APPOINTMENT (OUTPATIENT)
Age: 69
End: 2024-12-31

## 2025-01-04 ENCOUNTER — NON-APPOINTMENT (OUTPATIENT)
Age: 70
End: 2025-01-04

## 2025-01-07 ENCOUNTER — APPOINTMENT (OUTPATIENT)
Dept: OTOLARYNGOLOGY | Facility: CLINIC | Age: 70
End: 2025-01-07
Payer: COMMERCIAL

## 2025-01-07 ENCOUNTER — NON-APPOINTMENT (OUTPATIENT)
Age: 70
End: 2025-01-07

## 2025-01-07 VITALS
SYSTOLIC BLOOD PRESSURE: 168 MMHG | DIASTOLIC BLOOD PRESSURE: 89 MMHG | WEIGHT: 180 LBS | HEIGHT: 67 IN | TEMPERATURE: 96.5 F | BODY MASS INDEX: 28.25 KG/M2 | HEART RATE: 48 BPM

## 2025-01-07 DIAGNOSIS — Z80.0 FAMILY HISTORY OF MALIGNANT NEOPLASM OF DIGESTIVE ORGANS: ICD-10-CM

## 2025-01-07 DIAGNOSIS — E07.9 DISORDER OF THYROID, UNSPECIFIED: ICD-10-CM

## 2025-01-07 DIAGNOSIS — Z80.42 FAMILY HISTORY OF MALIGNANT NEOPLASM OF PROSTATE: ICD-10-CM

## 2025-01-07 DIAGNOSIS — I10 ESSENTIAL (PRIMARY) HYPERTENSION: ICD-10-CM

## 2025-01-07 DIAGNOSIS — H91.90 UNSPECIFIED HEARING LOSS, UNSPECIFIED EAR: ICD-10-CM

## 2025-01-07 PROCEDURE — 92550 TYMPANOMETRY & REFLEX THRESH: CPT | Mod: 52

## 2025-01-07 PROCEDURE — 99203 OFFICE O/P NEW LOW 30 MIN: CPT

## 2025-01-07 PROCEDURE — 92557 COMPREHENSIVE HEARING TEST: CPT | Mod: LT

## 2025-01-07 RX ORDER — AMLODIPINE BESYLATE 5 MG/1
TABLET ORAL
Refills: 0 | Status: ACTIVE | COMMUNITY

## 2025-01-07 RX ORDER — ASCORBIC ACID 500 MG
TABLET ORAL
Refills: 0 | Status: ACTIVE | COMMUNITY

## 2025-01-07 RX ORDER — MULTIVITAMIN
TABLET ORAL
Refills: 0 | Status: ACTIVE | COMMUNITY

## 2025-01-07 RX ORDER — METRONIDAZOLE 7.5 MG/G
CREAM TOPICAL
Refills: 0 | Status: ACTIVE | COMMUNITY

## 2025-01-07 RX ORDER — LEVOTHYROXINE SODIUM 0.17 MG/1
TABLET ORAL
Refills: 0 | Status: ACTIVE | COMMUNITY

## 2025-01-16 NOTE — ED ADULT NURSE NOTE - NSFALLRSKASSESASSIST_ED_ALL_ED
January 16, 2025      [facility]  [address]  [place] [address]  Phone: [phone]  Fax: [phone]       Patient: Brenda Rhodes   YOB: 2002  Date of Visit: 01/16/2025    To Whom It May Concern:    Estrella Rhodes  was at [facility] on 01/16/2025. The patient may return to work/school on 01/17/2024 with no restrictions. If you have any questions or concerns, or if I can be of further assistance, please do not hesitate to contact me.    Sincerely,        Shaniqua Prather MA     
no

## 2025-04-18 ENCOUNTER — NON-APPOINTMENT (OUTPATIENT)
Age: 70
End: 2025-04-18

## 2025-04-24 ENCOUNTER — NON-APPOINTMENT (OUTPATIENT)
Age: 70
End: 2025-04-24